# Patient Record
Sex: MALE | Race: OTHER | URBAN - METROPOLITAN AREA
[De-identification: names, ages, dates, MRNs, and addresses within clinical notes are randomized per-mention and may not be internally consistent; named-entity substitution may affect disease eponyms.]

---

## 2019-05-10 ENCOUNTER — EMERGENCY (EMERGENCY)
Facility: HOSPITAL | Age: 59
LOS: 1 days | End: 2019-05-10
Attending: EMERGENCY MEDICINE
Payer: MEDICAID

## 2019-05-10 VITALS
TEMPERATURE: 99 F | DIASTOLIC BLOOD PRESSURE: 70 MMHG | HEART RATE: 82 BPM | RESPIRATION RATE: 22 BRPM | OXYGEN SATURATION: 96 % | SYSTOLIC BLOOD PRESSURE: 144 MMHG

## 2019-05-10 VITALS — HEIGHT: 69 IN | WEIGHT: 184.09 LBS

## 2019-05-10 PROCEDURE — 99283 EMERGENCY DEPT VISIT LOW MDM: CPT

## 2019-05-10 PROCEDURE — 93010 ELECTROCARDIOGRAM REPORT: CPT

## 2019-05-10 PROCEDURE — 93005 ELECTROCARDIOGRAM TRACING: CPT

## 2019-05-10 PROCEDURE — 99284 EMERGENCY DEPT VISIT MOD MDM: CPT

## 2019-05-10 RX ORDER — IPRATROPIUM/ALBUTEROL SULFATE 18-103MCG
3 AEROSOL WITH ADAPTER (GRAM) INHALATION ONCE
Refills: 0 | Status: DISCONTINUED | OUTPATIENT
Start: 2019-05-10 | End: 2019-05-15

## 2019-05-10 RX ORDER — DEXAMETHASONE 0.5 MG/5ML
10 ELIXIR ORAL ONCE
Refills: 0 | Status: DISCONTINUED | OUTPATIENT
Start: 2019-05-10 | End: 2019-05-15

## 2019-05-10 NOTE — ED STATDOCS - NS ED ROS FT
Review of Systems:  	•	CONSTITUTIONAL - no fever, no diaphoresis, no weight change  	•	SKIN - no rash  	•	HEMATOLOGIC - no bleeding, no bruising  	•	EYES - no eye pain, no blurred vision  	•	ENT - no change in hearing, no pain  	•	RESPIRATORY - (+) shortness of breath, (+) cough  	•	CARDIAC - no chest pain, no palpitations  	•	GI - no abd pain, no nausea, no vomiting, no diarrhea, no constipation, no bleeding  	•	GENITO-URINARY - no discharge, no dysuria; no hematuria,   	•	ENDO - no polydypsia, no polyurea, no heat/no cold intolerance  	•	MUSCULOSKELETAL - no joint paint, no swelling, no redness  	•	NEUROLOGIC - no weakness, no headache, no anesthesia, no paresthesias  	•	PSYCH - no anxiety, non suicidal, non homicidal, no hallucination, no depression

## 2019-05-10 NOTE — ED STATDOCS - OBJECTIVE STATEMENT
58 y/o M pt with PMHx penile CA (s/p procedure in [place]) presents to the ED c/o SOB beginning 5 days ago.  Pt reports feeling difficulty breathing and coughing for the past 4-5 days, states "my inhaler just stopped working" and has not been helping his asthma.  Pt states his current symptoms feel similar to his previous asthma exacerbations, he has never been intubated previously.  Pt states he has not had insurance for the past several years, recently got new insurance, will start working June 1st.  Denies fever, chills, congestion, abdominal pain, N/V/D.  No further acute complaints at this time.

## 2019-05-10 NOTE — ED STATDOCS - CLINICAL SUMMARY MEDICAL DECISION MAKING FREE TEXT BOX
pt with hx asthma no intubation in past, presents with 4-5 days of SOB, asthma exacerbation not relieved with nebs at home, will give Duoneb and decadron (pt does not have insurance for meds at this point), and reassess.

## 2019-05-10 NOTE — ED STATDOCS - PHYSICAL EXAMINATION
VITAL SIGNS: I have reviewed nursing notes and confirm.  CONSTITUTIONAL: Well-developed; well-nourished; in no acute distress.  SKIN: Skin exam is warm and dry, no acute rash.  HEAD: Normocephalic; atraumatic.  EYES: PERRL, EOM intact; conjunctiva and sclera clear.  ENT: No nasal discharge; airway clear. Throat clear.  NECK: Supple; non tender.  No lymphadenopathy.  CARD: S1, S2 normal; no murmurs, gallops, or rubs. Regular rate and rhythm.  RESP: No rales or rhonchi, diffuse wheezing to lower base.  ABD: Normal bowel sounds; soft; non-distended; non-tender; no hepatosplenomegaly.  EXT: Normal ROM. No clubbing, cyanosis or edema.  NEURO: Alert, oriented. Grossly unremarkable. No focal deficits.  PSYCH: Cooperative, appropriate.

## 2019-12-06 NOTE — ED ADULT TRIAGE NOTE - WEIGHT IN KG
Cookie Romano is a 67year old female presenting with pre-op exam    Concerns: DOS 12/16/19 with Dr. Nathanael Burgos for A&P repair    Denies known Latex allergy or symptoms of Latex sensitivity. Medications reviewed and updated. Social History     Tobacco Use   Smoking Status Former Smoker   â¢ Years: 14.00   â¢ Types: Cigarettes   Smokeless Tobacco Never Used       Health Maintenance Summary     Shingles Vaccine (1 of 2)  Overdue since 2/10/1992    DTaP/Tdap/Td Vaccine (1 - Tdap)  Postponed until 6/22/2023    Medicare Wellness 65+ (Yearly)  Next due on 7/8/2020    Depression Screening (Yearly)  Next due on 7/8/2020    Pneumococcal Vaccine 65+   Completed    Osteoporosis Screening   Completed    Influenza Vaccine   Completed    Meningococcal Vaccine   Aged Out          Patient is due for topics as listed above but is not proceeding with Immunization(s) Shingles at this time. Shingles unavailable. 83.5

## 2020-10-09 PROBLEM — J45.909 UNSPECIFIED ASTHMA, UNCOMPLICATED: Chronic | Status: ACTIVE | Noted: 2019-05-10

## 2021-01-13 PROBLEM — Z00.00 ENCOUNTER FOR PREVENTIVE HEALTH EXAMINATION: Status: ACTIVE | Noted: 2021-01-13

## 2021-01-15 ENCOUNTER — APPOINTMENT (OUTPATIENT)
Dept: GASTROENTEROLOGY | Facility: CLINIC | Age: 61
End: 2021-01-15

## 2021-07-26 ENCOUNTER — APPOINTMENT (OUTPATIENT)
Dept: OTOLARYNGOLOGY | Facility: CLINIC | Age: 61
End: 2021-07-26

## 2022-10-03 ENCOUNTER — APPOINTMENT (OUTPATIENT)
Dept: OTOLARYNGOLOGY | Facility: CLINIC | Age: 62
End: 2022-10-03